# Patient Record
Sex: MALE | Race: OTHER | HISPANIC OR LATINO | Employment: UNEMPLOYED | URBAN - METROPOLITAN AREA
[De-identification: names, ages, dates, MRNs, and addresses within clinical notes are randomized per-mention and may not be internally consistent; named-entity substitution may affect disease eponyms.]

---

## 2023-08-08 ENCOUNTER — HOSPITAL ENCOUNTER (EMERGENCY)
Facility: HOSPITAL | Age: 63
Discharge: HOME | End: 2023-08-08
Attending: EMERGENCY MEDICINE | Admitting: EMERGENCY MEDICINE
Payer: COMMERCIAL

## 2023-08-08 ENCOUNTER — APPOINTMENT (EMERGENCY)
Dept: RADIOLOGY | Facility: HOSPITAL | Age: 63
End: 2023-08-08
Payer: COMMERCIAL

## 2023-08-08 VITALS
DIASTOLIC BLOOD PRESSURE: 60 MMHG | BODY MASS INDEX: 28.83 KG/M2 | HEART RATE: 50 BPM | TEMPERATURE: 97.1 F | OXYGEN SATURATION: 95 % | RESPIRATION RATE: 16 BRPM | SYSTOLIC BLOOD PRESSURE: 103 MMHG | WEIGHT: 184.08 LBS

## 2023-08-08 DIAGNOSIS — T50.901A ACCIDENTAL OVERDOSE, INITIAL ENCOUNTER: Primary | ICD-10-CM

## 2023-08-08 PROCEDURE — 71046 X-RAY EXAM CHEST 2 VIEWS: CPT

## 2023-08-08 RX ORDER — NALOXONE HYDROCHLORIDE 1 MG/ML
2 INJECTION PARENTERAL ONCE
Status: COMPLETED | OUTPATIENT
Start: 2023-08-08 | End: 2023-08-08

## 2023-08-08 RX ORDER — NALOXONE HYDROCHLORIDE 1 MG/ML
INJECTION INTRAMUSCULAR; INTRAVENOUS; SUBCUTANEOUS
Status: COMPLETED
Start: 2023-08-08 | End: 2023-08-08

## 2023-08-08 RX ADMIN — NALOXONE HYDROCHLORIDE 2 MG: 1 INJECTION PARENTERAL at 16:41

## 2023-08-08 RX ADMIN — SODIUM CHLORIDE 1000 ML: 0.9 INJECTION, SOLUTION INTRAVENOUS at 18:07

## 2023-08-08 RX ADMIN — NALOXONE HYDROCHLORIDE 2 MG: 1 INJECTION, SOLUTION INTRAMUSCULAR; INTRAVENOUS; SUBCUTANEOUS at 16:41

## 2023-08-08 NOTE — ED PROVIDER NOTES
History  Chief Complaint   Patient presents with   • Heroin Overdose - Accidental     EMS found him uncoumnscious. Pt admits to having done small bag of heroin. 1mg Narcan IV given       43-year-old male presents via EMS for reported overdose patient midst to "smoking crack". Patient reports he is unsure of the contents of the recreational substance he smoked today as he was unsure it was a "crack". Patient reports no other complaints. EMS reports they, patient unconscious unresponsive apneic and after the administration of 1 mg intravenous naloxone the patient had return to spontaneous respiratory effort. Patient arrives sleepy, able to answer questions however falling asleep when not interacted with notably decreasing oxygen saturation for which additional naloxone was administered. Patient has no external signs of trauma and denies any other complaints. Prior to Admission Medications   Prescriptions Last Dose Informant Patient Reported? Taking?   naloxone (NARCAN) 4 mg/0.1 mL nasal spray   No No   Sig: Administer 1 spray into a nostril. If no response after 2-3 minutes, give another dose in the other nostril using a new spray. sertraline (Zoloft) 50 mg tablet   No No   Sig: Take 1 tablet (50 mg total) by mouth daily      Facility-Administered Medications: None       Past Medical History:   Diagnosis Date   • Anxiety    • Depression    • Osteoporosis    • Pre-diabetes        Past Surgical History:   Procedure Laterality Date   • APPENDECTOMY         History reviewed. No pertinent family history. I have reviewed and agree with the history as documented. E-Cigarette/Vaping     E-Cigarette/Vaping Substances   • Nicotine No    • THC No    • CBD No    • Flavoring No    • Other No    • Unknown No      Social History     Tobacco Use   • Smoking status: Every Day     Packs/day: 1.00     Types: Cigarettes   • Smokeless tobacco: Never   Substance Use Topics   • Alcohol use: Never   • Drug use:  Yes Types: Cocaine, Fentanyl, Heroin       Review of Systems   Constitutional: Negative for chills, fatigue and fever. HENT: Negative for congestion, ear pain, rhinorrhea and sore throat. Eyes: Negative for redness. Respiratory: Negative for chest tightness and shortness of breath. Cardiovascular: Negative for chest pain and palpitations. Gastrointestinal: Negative for abdominal pain, nausea and vomiting. Genitourinary: Negative for dysuria and hematuria. Musculoskeletal: Negative. Skin: Negative for rash. Neurological: Negative for dizziness, syncope, light-headedness and numbness. Physical Exam  Physical Exam  Vitals and nursing note reviewed. Constitutional:       Appearance: Normal appearance. He is well-developed. HENT:      Head: Normocephalic and atraumatic. Comments: Exam is negative for hemotympanum no septal hematoma visualized. No  Signs of basilar skull fracture including periorbital ecchymosis and periauricular ecchymosis. No crepitus, deformities, depressions of the skull were palpated. Eyes:      General: No scleral icterus. Pupils: Pupils are equal, round, and reactive to light. Cardiovascular:      Rate and Rhythm: Normal rate and regular rhythm. Pulses: Normal pulses. Pulmonary:      Effort: Pulmonary effort is normal. No respiratory distress. Breath sounds: No stridor. Abdominal:      General: There is no distension. Palpations: There is no mass. Musculoskeletal:      Cervical back: Normal range of motion. Skin:     General: Skin is warm and dry. Capillary Refill: Capillary refill takes less than 2 seconds. Coloration: Skin is not jaundiced. Neurological:      Mental Status: He is alert and oriented to person, place, and time.       Gait: Gait normal.   Psychiatric:         Mood and Affect: Mood normal.         Vital Signs  ED Triage Vitals [08/08/23 1622]   Temperature Pulse Respirations Blood Pressure SpO2   (!) 97.1 °F (36.2 °C) 76 22 114/82 94 %      Temp Source Heart Rate Source Patient Position - Orthostatic VS BP Location FiO2 (%)   Tympanic Monitor Sitting Left arm --      Pain Score       --           Vitals:    08/08/23 1809 08/08/23 1839 08/08/23 1936 08/08/23 2039   BP: 91/55 93/62 103/60    Pulse: (!) 48  (!) 39 (!) 50   Patient Position - Orthostatic VS:             Visual Acuity      ED Medications  Medications   naloxone (NARCAN) 2 mg/2 mL injection 2 mg (2 mg Intravenous Given 8/8/23 1641)   sodium chloride 0.9 % bolus 1,000 mL (0 mL Intravenous Stopped 8/8/23 2038)       Diagnostic Studies  Results Reviewed     None                 XR chest 2 views    (Results Pending)              Procedures  Procedures         ED Course  ED Course as of 08/08/23 2050   Tue Aug 08, 2023   1834 Patient noted to have an episode of hypoxia as per nursing staff placed on 2 L nasal cannula maintaining oxygen saturation of 98%. Patient also noted to have an episode of bradycardia and hypotension high suspicion for polypharmacy such as xylazine. 1 L of normal saline fluids administered. 2028 Patient is awake alert and oriented, nursing staff informed this provider oxygen will be discontinued to trial patient on room air. Nursing staff providing p.o. challenge at this time and ambulating patient to determine gait. 2039 Patient is able to eat without problem, is ambulatory without difficulty. Patient clinically sober at this time, vital signs are stable and WNL. Patient was reexamined at this time and informed of laboratory and/or imaging results and was found to be stable for discharge. Return to emergency department criteria was reviewed with the patient who verbalized understanding and was agreeable to discharge and the treatment plan at this time. SBIRT 20yo+    Flowsheet Row Most Recent Value   Initial Alcohol Screen: US AUDIT-C     1.  How often do you have a drink containing alcohol? 0 Filed at: 08/08/2023 1627   2. How many drinks containing alcohol do you have on a typical day you are drinking? 0 Filed at: 08/08/2023 1627   3a. Male UNDER 65: How often do you have five or more drinks on one occasion? 0 Filed at: 08/08/2023 1627   3b. FEMALE Any Age, or MALE 65+: How often do you have 4 or more drinks on one occassion? 0 Filed at: 08/08/2023 1627   Audit-C Score 0 Filed at: 08/08/2023 1627   CHARLES: How many times in the past year have you. .. Used an illegal drug or used a prescription medication for non-medical reasons? Never Filed at: 08/08/2023 1627                    Medical Decision Making  53-year-old male presents for evaluation of reported overdose has been given naloxone which prompted a return of spontaneous respiratory effort arrives sleepy however conscious alert and oriented when interacting verbally, given episodes of hypoxia occurring when the patient is not interacted with naloxone was administered 2 mg intravenous which prompted improvement in respiratory effort, patient noted to have hypotension and episodes of hypoxia after initial naloxone prompting concern for potential polysubstance use for which the patient was observed, CXR for potential aspiration pneumonia obtained - on my interpretation is negative. 2LPM NC applied until patient able to tolerate room air and maintain O2 above 93%. IV normal saline was administered with improvement in blood pressure. Over the course of 4-1/2 hours the patient metabolized his recreational substances with return of normal vital signs, patient was ambulatory without gait disturbance able to eat and drink without difficulty. Patient denying complaints and clinically sober. No indication for blood work or imaging given lack of traumatic inciting event and patient's denial of any other complaints, vital signs do not meet SIRS criteria no occasion for septic work-up or empiric IV antibiotic therapy.     Strict return to ED precautions discussed. Patient and/or family members verbalizes understanding and agrees with plan. Patient is stable for discharge     Portions of the record may have been created with voice recognition software. Occasional wrong word or "sound a like" substitutions may have occurred due to the inherent limitations of voice recognition software. Read the chart carefully and recognize, using context, where substitutions have occurred. Risk  Prescription drug management. Disposition  Final diagnoses:   Accidental overdose, initial encounter     Time reflects when diagnosis was documented in both MDM as applicable and the Disposition within this note     Time User Action Codes Description Comment    8/8/2023  8:46 PM Diana Giraldo N [place] Accidental overdose, initial encounter       ED Disposition     ED Disposition   Discharge    Condition   Good    Date/Time   Tue Aug 8, 2023  8:46 PM    [facility] discharge to home/self care. Follow-up Information     Follow up With Specialties Details Why Contact [facility]  Schedule an appointment as soon as possible for a visit in 2 days for rehabilitation from addictive substance [id number],   Cliff Garcia, [address] [phone]          Patient's Medications   Discharge Prescriptions    No medications on file       No discharge procedures on file.     PDMP Review     None          ED Provider  Electronically Signed by           Daniel Chen PA-C  08/08/23 2050

## 2023-08-09 NOTE — ED NOTES
Pt awake and alert, eating sandwich watching TV. Pt used bathroom and is steady on his feet. Removed nasal cannula.       Hans Davis RN  08/08/23 2039

## 2023-08-14 ENCOUNTER — OFFICE VISIT (OUTPATIENT)
Dept: FAMILY MEDICINE CLINIC | Facility: CLINIC | Age: 63
End: 2023-08-14

## 2023-08-14 VITALS
BODY MASS INDEX: 30.76 KG/M2 | DIASTOLIC BLOOD PRESSURE: 80 MMHG | RESPIRATION RATE: 16 BRPM | TEMPERATURE: 97.4 F | HEIGHT: 67 IN | WEIGHT: 196 LBS | HEART RATE: 58 BPM | SYSTOLIC BLOOD PRESSURE: 170 MMHG | OXYGEN SATURATION: 97 %

## 2023-08-14 DIAGNOSIS — I10 PRIMARY HYPERTENSION: Primary | ICD-10-CM

## 2023-08-14 DIAGNOSIS — Z12.11 SCREEN FOR COLON CANCER: ICD-10-CM

## 2023-08-14 PROCEDURE — 99214 OFFICE O/P EST MOD 30 MIN: CPT | Performed by: FAMILY MEDICINE

## 2023-08-14 RX ORDER — BLOOD PRESSURE TEST KIT
KIT MISCELLANEOUS 2 TIMES DAILY
Qty: 1 KIT | Refills: 0 | Status: SHIPPED | OUTPATIENT
Start: 2023-08-14

## 2023-08-14 RX ORDER — LISINOPRIL 10 MG/1
10 TABLET ORAL DAILY
Qty: 90 TABLET | Refills: 3 | Status: SHIPPED | OUTPATIENT
Start: 2023-08-14

## 2023-08-14 NOTE — PROGRESS NOTES
Name: Siri Varghese      : 1960      MRN: [id number]  Encounter Provider: Kaylee Banda MD  Encounter Date: 2023   Encounter department: [address] [facility]     1. Primary hypertension  Assessment & Plan:  Assessment:   · Blood pressure at today’s office visit 170/80 mmHg, poorly controlled   · Blood Pressure goal as per JNC-8 less than 140/90 mmHg,   · Currently on not on medications    Plan:   · Will start patient on Lisinopril 10 mg qd  · Avoid beta blockers, patient with hx of bradycardia and  cocaine use, last 3 weeks ago. · BP goals and possible side effects reviewed with patient, recommended to call the office/schedule appointment  if need prior to his next visit if needed   · The patient was educated on the importance of medication compliance and to monitor her blood pressure at home on a  daily basis. · Ideally in quiet room; after 5 minutes of rest, sited, legs uncrossed and in a relaxed environment. · Recommended to  bring BP diary in visit. · Will recommend performing aerobic exercise for at least more than 3 times per week or more that 150 min x week of moderate physical activity. · Will recommend sodium and fat reduction and to increase fruit consumption. · Reassess BP in 2 weeks   · ED precautions given. Orders:  -     lisinopril (ZESTRIL) 10 mg tablet; Take 1 tablet (10 mg total) by mouth daily  -     Blood Pressure KIT; Use 2 (two) times a day  -     Albumin / creatinine urine ratio;  Future         Subjective      It was a pleasure to see Mino Ortiz is a 58 y.o.  male with PMH cocaine consumption, bradycardia and heroine use here today for HTN evaluation   Denies unintentional weight loss, fever, chills, fatigue, weakness, headaches, dizziness, rashes, blurred vision, nausea, palpitation, chest pain, SOB, abdominal pain, urinary changes, bowel changes, legs swelling/pain, sleep problems,  sick contacts or recent travel. Hypertension  This is a chronic problem. The current episode started today. The problem is unchanged. The problem is uncontrolled. Pertinent negatives include no chest pain, headaches, palpitations or shortness of breath. Review of Systems   Constitutional: Negative for activity change, appetite change, chills, fatigue and fever. HENT: Negative for congestion, postnasal drip, rhinorrhea and sore throat. Eyes: Negative for visual disturbance. Respiratory: Negative for cough, chest tightness, shortness of breath and wheezing. Cardiovascular: Negative for chest pain, palpitations and leg swelling. Gastrointestinal: Negative for abdominal distention, abdominal pain, blood in stool, constipation, diarrhea, nausea and vomiting. Genitourinary: Negative for difficulty urinating, dysuria and hematuria. Musculoskeletal: Negative for arthralgias and myalgias. Skin: Negative for rash. Neurological: Negative for dizziness, syncope, weakness, light-headedness, numbness and headaches. Psychiatric/Behavioral: Negative for agitation, behavioral problems, self-injury, sleep disturbance and suicidal ideas. Current Outpatient Medications on File Prior to Visit   Medication Sig   • naloxone (NARCAN) 4 mg/0.1 mL nasal spray Administer 1 spray into a nostril. If no response after 2-3 minutes, give another dose in the other nostril using a new spray. • sertraline (Zoloft) 50 mg tablet Take 1 tablet (50 mg total) by mouth daily       Objective     /80 (BP Location: Right arm, Patient Position: Sitting, Cuff Size: Standard)   Pulse 58   Temp (!) 97.4 °F (36.3 °C) (Temporal)   Resp 16   Ht 5' 7" (1.702 m)   Wt 88.9 kg (196 lb)   SpO2 97%   BMI 30.70 kg/m²     Physical Exam  Vitals and nursing note reviewed. Constitutional:       General: He is not in acute distress. Appearance: He is well-developed. He is obese. He is not ill-appearing, toxic-appearing or diaphoretic. HENT:      Head: Normocephalic and atraumatic. Eyes:      General: No scleral icterus. Extraocular Movements: Extraocular movements intact. Cardiovascular:      Rate and Rhythm: Normal rate and regular rhythm. No extrasystoles are present. Pulses:           Radial pulses are 2+ on the right side and 2+ on the left side. Heart sounds: Normal heart sounds, S1 normal and S2 normal. Heart sounds not distant. No murmur heard. No systolic murmur is present. No diastolic murmur is present. No friction rub. No gallop. No S3 or S4 sounds. Pulmonary:      Effort: Pulmonary effort is normal. No respiratory distress. Breath sounds: Normal breath sounds and air entry. Abdominal:      General: Bowel sounds are normal.      Palpations: Abdomen is soft. There is no mass. Tenderness: There is no abdominal tenderness. There is no right CVA tenderness, left CVA tenderness, guarding or rebound. Musculoskeletal:         General: No swelling, tenderness, deformity or signs of injury. Normal range of motion. Cervical back: Normal range of motion. Right lower leg: No edema. Left lower leg: No edema. Feet:      Right foot:      Skin integrity: No ulcer, skin breakdown, erythema, warmth, callus or dry skin. Left foot:      Skin integrity: No ulcer, skin breakdown, erythema, warmth, callus or dry skin. Skin:     General: Skin is warm. Findings: No rash. Neurological:      General: No focal deficit present. Mental Status: He is alert.        Stephanie Mccullough MD

## 2023-08-14 NOTE — ASSESSMENT & PLAN NOTE
Assessment:   · Blood pressure at today’s office visit 170/80 mmHg, poorly controlled   · Blood Pressure goal as per JNC-8 less than 140/90 mmHg,   · Currently on not on medications    Plan:   · Will start patient on Lisinopril 10 mg qd  · Avoid beta blockers, patient with hx of bradycardia and  cocaine use, last 3 weeks ago. · BP goals and possible side effects reviewed with patient, recommended to call the office/schedule appointment  if need prior to his next visit if needed   · The patient was educated on the importance of medication compliance and to monitor her blood pressure at home on a  daily basis. · Ideally in quiet room; after 5 minutes of rest, sited, legs uncrossed and in a relaxed environment. · Recommended to  bring BP diary in visit. · Will recommend performing aerobic exercise for at least more than 3 times per week or more that 150 min x week of moderate physical activity. · Will recommend sodium and fat reduction and to increase fruit consumption. · Reassess BP in 2 weeks   · ED precautions given.

## 2023-08-28 ENCOUNTER — HOSPITAL ENCOUNTER (EMERGENCY)
Facility: HOSPITAL | Age: 63
Discharge: HOME | End: 2023-08-28
Attending: EMERGENCY MEDICINE | Admitting: EMERGENCY MEDICINE
Payer: COMMERCIAL

## 2023-08-28 VITALS
DIASTOLIC BLOOD PRESSURE: 99 MMHG | HEART RATE: 82 BPM | OXYGEN SATURATION: 94 % | RESPIRATION RATE: 15 BRPM | SYSTOLIC BLOOD PRESSURE: 167 MMHG | TEMPERATURE: 97.6 F

## 2023-08-28 DIAGNOSIS — T50.901A ACCIDENTAL OVERDOSE, INITIAL ENCOUNTER: ICD-10-CM

## 2023-08-28 DIAGNOSIS — F11.10 HEROIN ABUSE (HCC): Primary | ICD-10-CM

## 2023-08-28 PROCEDURE — 99284 EMERGENCY DEPT VISIT MOD MDM: CPT

## 2023-08-28 PROCEDURE — 99285 EMERGENCY DEPT VISIT HI MDM: CPT | Performed by: EMERGENCY MEDICINE

## 2023-08-28 PROCEDURE — 93005 ELECTROCARDIOGRAM TRACING: CPT

## 2023-08-28 PROCEDURE — 96374 THER/PROPH/DIAG INJ IV PUSH: CPT

## 2023-08-28 RX ORDER — NALOXONE HYDROCHLORIDE 1 MG/ML
2 INJECTION INTRAMUSCULAR; INTRAVENOUS; SUBCUTANEOUS ONCE
Status: COMPLETED | OUTPATIENT
Start: 2023-08-28 | End: 2023-08-28

## 2023-08-28 RX ADMIN — NALOXONE HYDROCHLORIDE 2 MG: 1 INJECTION PARENTERAL at 20:54

## 2023-08-28 RX ADMIN — NALOXONE HYDROCHLORIDE 4 MG: 4 SPRAY NASAL at 22:10

## 2023-08-29 LAB
ATRIAL RATE: 74 BPM
P AXIS: 50 DEGREES
PR INTERVAL: 148 MS
QRS AXIS: 74 DEGREES
QRSD INTERVAL: 76 MS
QT INTERVAL: 366 MS
QTC INTERVAL: 406 MS
T WAVE AXIS: 6 DEGREES
VENTRICULAR RATE: 74 BPM

## 2023-08-29 PROCEDURE — 93010 ELECTROCARDIOGRAM REPORT: CPT | Performed by: INTERNAL MEDICINE

## 2023-08-29 NOTE — ED PROVIDER NOTES
History  Chief Complaint   Patient presents with   • Overdose - Accidental     Pt states he did "a little bit" of heroin tonight. States he snorts it. Pinpoint pupils in triage. 59 yo M brought by ambulance from home, where he was found by his daughter with his eyes rolled back, blue lips, slow breathing, and fast heart rate, while unresponsive in his bed. He was last normal at dinner and his daughter knows he went to take a shower, and it was shortly after getting out of the shower that she checked on him and found him like that. She said he was also sweaty. She called EMS when he wasn't responding. Paramedics were able to arouse him verbally, and he admitted to using heroin, which his daughter affirmed was her suspicion, because he has recently been relapsing. On arrival in the ED, he is still awake, except he becomes drowsy and slows his breathing, with hypoxia if not constantly stimulated. He denies this was intentional overdose, and says it wasn't even very much, but because he hasn't been using frequently, it must have been too much for him. History provided by:  Patient      Prior to Admission Medications   Prescriptions Last Dose Informant Patient Reported? Taking? Blood Pressure KIT   No No   Sig: Use 2 (two) times a day   lisinopril (ZESTRIL) 10 mg tablet   No No   Sig: Take 1 tablet (10 mg total) by mouth daily   naloxone (NARCAN) 4 mg/0.1 mL nasal spray   No No   Sig: Administer 1 spray into a nostril. If no response after 2-3 minutes, give another dose in the other nostril using a new spray. sertraline (Zoloft) 50 mg tablet   No No   Sig: Take 1 tablet (50 mg total) by mouth daily      Facility-Administered Medications: None       Past Medical History:   Diagnosis Date   • Anxiety    • Depression    • Osteoporosis    • Pre-diabetes        Past Surgical History:   Procedure Laterality Date   • APPENDECTOMY         History reviewed. No pertinent family history.   I have reviewed and agree with the history as documented. E-Cigarette/Vaping     E-Cigarette/Vaping Substances   • Nicotine No    • THC No    • CBD No    • Flavoring No    • Other No    • Unknown No      Social History     Tobacco Use   • Smoking status: Every Day     Packs/day: 1.00     Types: Cigarettes   • Smokeless tobacco: Never   Substance Use Topics   • Alcohol use: Never   • Drug use: Yes     Types: Cocaine, Fentanyl, Heroin       Review of Systems    Physical Exam  Physical Exam  Vitals and nursing note reviewed. Constitutional:       Appearance: He is well-developed and well-groomed. HENT:      Head: Normocephalic and atraumatic. Right Ear: External ear normal.      Left Ear: External ear normal.      Nose: Nose normal.   Eyes:      Intraocular pressure: Right eye pressure is 1 mmHg. Left eye pressure is 1 mmHg. Conjunctiva/sclera:      Right eye: Right conjunctiva is injected. Left eye: Left conjunctiva is injected. Pupils: Pupils are equal, round, and reactive to light. Cardiovascular:      Rate and Rhythm: Normal rate. Pulmonary:      Effort: Pulmonary effort is normal.   Abdominal:      Tenderness: There is no abdominal tenderness. Musculoskeletal:         General: Normal range of motion. Cervical back: Normal range of motion and neck supple. Skin:     General: Skin is warm and dry. Capillary Refill: Capillary refill takes less than 2 seconds. Neurological:      Mental Status: He is oriented to person, place, and time and easily aroused. Cranial Nerves: No cranial nerve deficit. Coordination: Coordination normal.   Psychiatric:         Speech: Speech is delayed. Behavior: Behavior is slowed.          Vital Signs  ED Triage Vitals [08/28/23 2048]   Temperature Pulse Respirations Blood Pressure SpO2   97.6 °F (36.4 °C) 82 15 (!) 154/103 97 %      Temp Source Heart Rate Source Patient Position - Orthostatic VS BP Location FiO2 (%)   Oral Monitor Lying Right arm --      Pain Score       --           Vitals:    08/28/23 2048 08/28/23 2130 08/28/23 2200   BP: (!) 154/103 148/95 167/99   Pulse: 82 84 82   Patient Position - Orthostatic VS: Lying Lying Lying         Visual Acuity  Visual Acuity    Flowsheet Row Most Recent Value   L Pupil Size (mm) 1   R Pupil Size (mm) 1          ED Medications  Medications   naloxone (NARCAN) injection 2 mg (2 mg Intravenous Given 8/28/23 2054)   naloxone nasal- Given to patient by provider at discharge. (NARCAN) 4 mg/0.1 mL nasal spray 4 mg (4 mg Does not apply Given by Other 8/28/23 2210)       Diagnostic Studies  Results Reviewed     None                 No orders to display              Procedures  Procedures         ED Course  ED Course as of 08/29/23 0037   Mon Aug 28, 2023   2132 After naloxone, he is fully awake, c/w opiate overdose. He says he is trying to completely stop using, and has been attached to resources. His daughter is aware of his struggles as well. The goal is to observe for an interval until he has metabolized enough to not require repeated doses of naloxone. 2211 He remains alert, he spoke with [facility] and does not wish to seek inpatient rehab at this time                                             MDM    Disposition  Final diagnoses:   Heroin abuse ([address])   Accidental overdose, initial encounter     Time reflects when diagnosis was documented in both MDM as applicable and the Disposition within this note     Time User Action Codes Description Comment    8/28/2023  9:07 PM Desire Saeed Add [F11.10] Heroin abuse ([address])     8/28/2023  9:07 PM [place], [facility] [place] Accidental overdose, initial encounter       ED Disposition     ED Disposition   Discharge    Condition   Good    Date/Time   Mon Aug 28, 2023 10:07 PM    [facility] discharge to home/self care.                Follow-up Information    None         Discharge Medication List as of 8/28/2023 10:08 PM      CONTINUE these medications which have NOT CHANGED    Details   Blood Pressure KIT Use 2 (two) times a day, Starting Mon 8/14/2023, Normal      lisinopril (ZESTRIL) 10 mg tablet Take 1 tablet (10 mg total) by mouth daily, Starting Mon 8/14/2023, Normal      naloxone (NARCAN) 4 mg/0.1 mL nasal spray Administer 1 spray into a nostril. If no response after 2-3 minutes, give another dose in the other nostril using a new spray., Normal      sertraline (Zoloft) 50 mg tablet Take 1 tablet (50 mg total) by mouth daily, Starting Tue 11/15/2022, Normal             No discharge procedures on file.     PDMP Review     None          ED Provider  Electronically Signed by           Viviana Olmedo MD  08/29/23 1378

## 2023-08-29 NOTE — ED NOTES
[facility] attempted to contact patient, [facility] employee is to call back with  to speak to patient.       Ej Jason RN  08/28/23 0515

## 2023-08-29 NOTE — DISCHARGE INSTRUCTIONS
Follow up with your regular doctor and continue your medications as prescribed. Please return if you are feeling overwhelmed or if you feel unsafe toward yourself or others.

## 2023-08-29 NOTE — ED CARE HANDOFF
[facility] Warm Handoff Outcome Note    Patient name Nelson Credit  Location ED-28/ED-28 MRN [id number]  Age: 58 y.o. Plan Type: Warm Handoff                                                                                    Plan Date: 8/28/2023  Service:  ED Warm Handoff      Substance Use History:  Heroin    Warm Handoff Update:  [facility] called and got an , patient was discharged and they will follow up.     Warm Handoff Outcome: Treatment Related Resources

## 2023-10-23 ENCOUNTER — HOSPITAL ENCOUNTER (EMERGENCY)
Facility: HOSPITAL | Age: 63
Discharge: HOME | End: 2023-10-23
Attending: EMERGENCY MEDICINE | Admitting: EMERGENCY MEDICINE
Payer: COMMERCIAL

## 2023-10-23 VITALS
SYSTOLIC BLOOD PRESSURE: 105 MMHG | HEART RATE: 48 BPM | DIASTOLIC BLOOD PRESSURE: 63 MMHG | WEIGHT: 177.91 LBS | OXYGEN SATURATION: 95 % | TEMPERATURE: 96.8 F | BODY MASS INDEX: 27.86 KG/M2 | RESPIRATION RATE: 16 BRPM

## 2023-10-23 DIAGNOSIS — T40.601A OPIATE OVERDOSE, ACCIDENTAL OR UNINTENTIONAL, INITIAL ENCOUNTER (HCC): Primary | ICD-10-CM

## 2023-10-23 PROCEDURE — 99284 EMERGENCY DEPT VISIT MOD MDM: CPT | Performed by: EMERGENCY MEDICINE

## 2023-10-23 PROCEDURE — 99284 EMERGENCY DEPT VISIT MOD MDM: CPT

## 2023-10-23 RX ORDER — NALOXONE HYDROCHLORIDE 1 MG/ML
INJECTION INTRAMUSCULAR; INTRAVENOUS; SUBCUTANEOUS
Status: COMPLETED
Start: 2023-10-23 | End: 2023-10-23

## 2023-10-23 RX ORDER — NALOXONE HYDROCHLORIDE 1 MG/ML
1 INJECTION PARENTERAL ONCE
Status: COMPLETED | OUTPATIENT
Start: 2023-10-23 | End: 2023-10-23

## 2023-10-23 NOTE — CERTIFIED RECOVERY SPECIALIST
Certified  Note    Patient name: Riya Heard  Location: ED 06/ED [facility]: [address]  Attending:  Manas Luz MD MRN [id number]  : 1960  Age: 58 y.o. Sex: male Date 10/23/2023         Substance Use History:     Social History     Substance and Sexual Activity   Alcohol Use Never        Social History     Substance and Sexual Activity   Drug Use Yes    Types: Cocaine, Fentanyl, Heroin     Response from HOST    He requested OP and we have him scheduled for an appt on Friday, 10/27 at 11am with Aneesh Wade at [facility]. The address is : [address] [place] [address]  Phone number: [phone] ? ? Thank you!! Saray [facility]   [address] [place] [address],[facility], [address]   Phone: [phone]   Fax: [phone]  [email or web address]

## 2023-10-23 NOTE — CERTIFIED RECOVERY SPECIALIST
Certified  Note    Patient name: Scarlet Kern  Location: ED 06/ED [facility]: [address]  Attending:  Elissa Gilbert MD MRN [id number]  : 1960  Age: 58 y.o. Sex: male Date 10/23/2023         Substance Use History:     Social History     Substance and Sexual Activity   Alcohol Use Never        Social History     Substance and Sexual Activity   Drug Use Yes    Types: Cocaine, Fentanyl, Heroin     CRS attempted contact. Patient did respond to knock, but did not wake.   Will continue to follow    Nova Miranda

## 2023-10-23 NOTE — CERTIFIED RECOVERY SPECIALIST
Certified  Note    Patient name: Abhi Estrada  Location: ED 06/ED [facility]: [address]  Attending:  Mylene Bansal MD MRN [id number]  : 1960  Age: 58 y.o.     Sex: male Date 10/23/2023         Substance Use History:     Social History     Substance and Sexual Activity   Alcohol Use Never        Social History     Substance and Sexual Activity   Drug Use Yes    Types: Cocaine, Fentanyl, Heroin     CRS outreach made to HOST for update    [address] [facility]

## 2023-10-23 NOTE — ED PROVIDER NOTES
History  Chief Complaint   Patient presents with    Overdose - Accidental     Pt found unresponsive agonal breathing, EMS gave Narcan IM. Pt arrives awake with normal respirations. Pt admits to snorting 1 bag of fentanyl today     79-year-old male, presents by EMS after overdose. Patient states he snorted 1 bag of fentanyl. EMS reports patient was found unresponsive, given 2 mg IM Ativan by EMS, patient woke up afterwards. Patient has no current complaints, states he was not trying to harm himself. Denies any other drug or alcohol use. History provided by:  Patient and EMS personnel   used: No    Overdose - Accidental      Prior to Admission Medications   Prescriptions Last Dose Informant Patient Reported? Taking? Blood Pressure KIT Past Month  No Yes   Sig: Use 2 (two) times a day   lisinopril (ZESTRIL) 10 mg tablet 10/22/2023  No Yes   Sig: Take 1 tablet (10 mg total) by mouth daily   naloxone (NARCAN) 4 mg/0.1 mL nasal spray Past Month  No Yes   Sig: Administer 1 spray into a nostril. If no response after 2-3 minutes, give another dose in the other nostril using a new spray. sertraline (Zoloft) 50 mg tablet 10/22/2023  No Yes   Sig: Take 1 tablet (50 mg total) by mouth daily      Facility-Administered Medications: None       Past Medical History:   Diagnosis Date    Anxiety     Depression     Osteoporosis     Pre-diabetes        Past Surgical History:   Procedure Laterality Date    APPENDECTOMY         History reviewed. No pertinent family history. I have reviewed and agree with the history as documented.     E-Cigarette/Vaping     E-Cigarette/Vaping Substances    Nicotine No     THC No     CBD No     Flavoring No     Other No     Unknown No      Social History     Tobacco Use    Smoking status: Every Day     Packs/day: 1.00     Types: Cigarettes    Smokeless tobacco: Never   Substance Use Topics    Alcohol use: Never    Drug use: Yes     Types: Cocaine, Fentanyl, Heroin Review of Systems   Constitutional: Negative. Gastrointestinal: Negative. Neurological: Negative. Physical Exam  Physical Exam  Vitals and nursing note reviewed. Constitutional:       General: He is not in acute distress. HENT:      Head: Normocephalic and atraumatic. Eyes:      Extraocular Movements: Extraocular movements intact. Pupils: Pupils are equal, round, and reactive to light. Cardiovascular:      Rate and Rhythm: Regular rhythm. Bradycardia present. Pulmonary:      Effort: Pulmonary effort is normal.      Breath sounds: Normal breath sounds. Musculoskeletal:         General: Normal range of motion. Skin:     General: Skin is warm and dry. Neurological:      General: No focal deficit present. Mental Status: He is alert and oriented to person, place, and time. Motor: No weakness. Vital Signs  ED Triage Vitals [10/23/23 1217]   Temperature Pulse Respirations Blood Pressure SpO2   (!) 96.8 °F (36 °C) (!) 53 18 92/53 96 %      Temp Source Heart Rate Source Patient Position - Orthostatic VS BP Location FiO2 (%)   Tympanic Monitor Lying Left arm --      Pain Score       --           Vitals:    10/23/23 1217 10/23/23 1402   BP: 92/53 105/63   Pulse: (!) 53 (!) 48   Patient Position - Orthostatic VS: Lying          Visual Acuity      ED Medications  Medications   naloxone nasal- Given to patient by provider at discharge. (NARCAN) 4 mg/0.1 mL nasal spray 4 mg (has no administration in time range)   naloxone (FOR EMS ONLY) [facility]) 2 MG/2ML injection 2 mg ( Does not apply Given to EMS 10/23/23 1222)       Diagnostic Studies  Results Reviewed       None                   No orders to display              Procedures  Procedures         ED Course  ED Course as of 10/23/23 1422   Mon Oct 23, 2023   1421 Patient has been comfortable with normal respiratory effort since arrival.  Able to ambulate without difficulty. Set up with outpatient rehab for this week.   Given Narcan to take on discharge. Medical Decision Making  79-year-old male, presents after overdose by EMS. Differential diagnosis includes opiate overdose, intoxication, injury among other diagnoses. On exam, patient is awake and alert with no complaints. Normal respiratory effort, no signs of injury on exam.  We will continue to monitor in ED and reevaluate. Amount and/or Complexity of Data Reviewed  Independent Historian: EMS    Risk  Prescription drug management. Disposition  Final diagnoses:   Opiate overdose, accidental or unintentional, initial encounter [facility])     Time reflects when diagnosis was documented in both MDM as applicable and the Disposition within this note       Time User Action Codes Description Comment    10/23/2023  2:17 PM You Butler Accidental overdose, initial encounter     10/23/2023  2:18 PM Felipe Butler [facility] Opiate overdose, accidental or unintentional, initial encounter ([address])     10/23/2023  2:18 PM Adrian Espinoza Modify [T40.601A] Opiate overdose, accidental or unintentional, initial encounter ([address])     10/23/2023  2:18 PM Ga Butler Accidental overdose, initial encounter           ED Disposition       ED Disposition   Discharge    Condition   Stable    Date/Time   Mon Oct 23, 2023  2:18 PM    [address] [facility] discharge to home/self care. Follow-up Information    None         Patient's Medications   Discharge Prescriptions    No medications on file       No discharge procedures on file.     PDMP Review       None            ED Provider  Electronically Signed by             Eloisa Baker MD  10/23/23 5622

## 2023-10-23 NOTE — ED CARE HANDOFF
[address] [facility] Warm Handoff Outcome Note    Patient name Nicola Monique  Location ED 06/ED 06 MRN [id number]  Age: 58 y.o. Plan Type: Warm Handoff                                                                                    Plan Date: 10/23/2023  Service:  ED Warm Handoff      Substance Use History:  Polysub    Warm Handoff Update:  Pt going to OP services: Friday, 10/27 at 11:00am with [facility]. In person appointment at [facility]. [address] [place] [address], [address] [place] [address]  Phone number: [phone]. Warm Handoff Outcome:  Outpatient

## 2024-09-16 ENCOUNTER — RA CDI HCC (OUTPATIENT)
Dept: OTHER | Facility: HOSPITAL | Age: 64
End: 2024-09-16

## 2024-09-23 ENCOUNTER — OFFICE VISIT (OUTPATIENT)
Dept: FAMILY MEDICINE CLINIC | Facility: CLINIC | Age: 64
End: 2024-09-23

## 2024-09-23 VITALS
HEART RATE: 76 BPM | TEMPERATURE: 98 F | WEIGHT: 184 LBS | DIASTOLIC BLOOD PRESSURE: 86 MMHG | BODY MASS INDEX: 28.88 KG/M2 | RESPIRATION RATE: 18 BRPM | OXYGEN SATURATION: 98 % | SYSTOLIC BLOOD PRESSURE: 158 MMHG | HEIGHT: 67 IN

## 2024-09-23 DIAGNOSIS — R73.03 PREDIABETES: ICD-10-CM

## 2024-09-23 DIAGNOSIS — Z12.11 COLON CANCER SCREENING: ICD-10-CM

## 2024-09-23 DIAGNOSIS — I10 PRIMARY HYPERTENSION: ICD-10-CM

## 2024-09-23 DIAGNOSIS — Z11.4 SCREENING FOR HIV (HUMAN IMMUNODEFICIENCY VIRUS): ICD-10-CM

## 2024-09-23 DIAGNOSIS — Z23 ENCOUNTER FOR IMMUNIZATION: ICD-10-CM

## 2024-09-23 DIAGNOSIS — Z11.59 NEED FOR HEPATITIS C SCREENING TEST: ICD-10-CM

## 2024-09-23 DIAGNOSIS — F32.1 MODERATE MAJOR DEPRESSION (HCC): ICD-10-CM

## 2024-09-23 PROBLEM — F41.9 ANXIETY AND DEPRESSION: Status: ACTIVE | Noted: 2024-09-23

## 2024-09-23 PROBLEM — F32.A ANXIETY AND DEPRESSION: Status: ACTIVE | Noted: 2024-09-23

## 2024-09-23 PROCEDURE — G2211 COMPLEX E/M VISIT ADD ON: HCPCS | Performed by: DENTIST

## 2024-09-23 PROCEDURE — G0008 ADMIN INFLUENZA VIRUS VAC: HCPCS | Performed by: DENTIST

## 2024-09-23 PROCEDURE — 90662 IIV NO PRSV INCREASED AG IM: CPT | Performed by: DENTIST

## 2024-09-23 PROCEDURE — 99213 OFFICE O/P EST LOW 20 MIN: CPT | Performed by: DENTIST

## 2024-09-23 RX ORDER — LISINOPRIL 10 MG/1
10 TABLET ORAL DAILY
Qty: 90 TABLET | Refills: 3 | Status: SHIPPED | OUTPATIENT
Start: 2024-09-23

## 2024-09-23 NOTE — ASSESSMENT & PLAN NOTE
/86  Patient reports he has been out of home medications for the past almost 2 months  Home meds: lisinopril 10 mg qd    -Record BP at home and bring to next visit   -Refilled lisinopril 10 mg qd   -Advised to maintain a low sodium diet   -Alb/Cr ratio, bmp, A1c, lipid panel   -F/U w/results     Orders:    lisinopril (ZESTRIL) 10 mg tablet; Take 1 tablet (10 mg total) by mouth daily    Basic metabolic panel; Future    Albumin / creatinine urine ratio; Future    Lipid Panel with Direct LDL reflex; Future

## 2024-09-23 NOTE — ASSESSMENT & PLAN NOTE
Depression Screening Follow-up Plan: Patient's depression screening was positive with a PHQ-9 score of 7. Patient assessed for underlying major depression. They have no active suicidal ideations. Brief counseling provided and recommend additional follow-up/re-evaluation next office visit.  Sean score of 6 and patient reports his anxiety has improved since he began living with his daughter   Home meds: Zoloft 50 mg qd    -Continue home meds

## 2024-09-23 NOTE — PROGRESS NOTES
Ambulatory Visit  Name: Mino Cyr      : 1960      MRN: [id number]  Encounter Provider: Elmira Gray MD  Encounter Date: 2024   Encounter department: [facility]    Assessment & Plan  Primary hypertension  /86  Patient reports he has been out of home medications for the past almost 2 months  Home meds: lisinopril 10 mg qd    -Record BP at home and bring to next visit   -Refilled lisinopril 10 mg qd   -Advised to maintain a low sodium diet   -Alb/Cr ratio, bmp, A1c, lipid panel   -F/U w/results     Orders:    lisinopril (ZESTRIL) 10 mg tablet; Take 1 tablet (10 mg total) by mouth daily    Basic metabolic panel; Future    Albumin / creatinine urine ratio; Future    Lipid Panel with Direct LDL reflex; Future    Moderate major depression (HCC)  Depression Screening Follow-up Plan: Patient's depression screening was positive with a PHQ-9 score of 7. Patient assessed for underlying major depression. They have no active suicidal ideations. Brief counseling provided and recommend additional follow-up/re-evaluation next office visit.  Reports he has been out of zoloft for the past 2 months    -refilled zoloft 50 mg qd     Orders:    sertraline (Zoloft) 50 mg tablet; Take 1 tablet (50 mg total) by mouth daily    Need for hepatitis C screening test    Orders:    Hepatitis C Antibody; Future    Screening for HIV (human immunodeficiency virus)    Orders:    HIV 1/2 AG/AB w Reflex [facility] for 2 yr old and above; Future    Encounter for immunization    Orders:    influenza vaccine, high-dose, PF 0.5 mL (Fluzone High Dose)    Colon cancer screening    Orders:    Ambulatory Referral to Gastroenterology; Future    Prediabetes    Orders:    Hemoglobin A1C; Future    Lipid Panel with Direct LDL reflex; Future       History of Present Illness     Mr. Blum is a pleasant 64 you male patient who presents to clinic today to re-establish care as he was lost to f/u due  to being incarcerated. Patient reports he has been out of his home medications for the past 2 months and is interested in getting lab work done and colonoscopy for GI cancer screening as it is long overdue.   Patient reports that he had labs doen while in detention and Doctor told him he had prediabetes however does not recall the value.   Patient denies blood in stool, urinary symtoms, chest pain, palpiattions, headaches, n/v/d/c.  Pateint advised to get shingles vaccine at his local pharmacy as his insurance does not cover shingles vaccine in office     Hypertension  Pertinent negatives include no chest pain, palpitations or shortness of breath.       History obtained from : patient  Review of Systems   Constitutional:  Negative for chills and fever.   HENT:  Negative for ear pain and sore throat.    Eyes:  Negative for pain and visual disturbance.   Respiratory:  Negative for cough and shortness of breath.    Cardiovascular:  Negative for chest pain and palpitations.   Gastrointestinal:  Negative for abdominal pain and vomiting.   Genitourinary:  Negative for dysuria and hematuria.   Musculoskeletal:  Negative for arthralgias and back pain.   Skin:  Negative for color change and rash.   Neurological:  Negative for seizures and syncope.   All other systems reviewed and are negative.    Current Outpatient Medications on File Prior to Visit   Medication Sig Dispense Refill    Blood Pressure KIT Use 2 (two) times a day 1 kit 0    naloxone (NARCAN) 4 mg/0.1 mL nasal spray Administer 1 spray into a nostril. If no response after 2-3 minutes, give another dose in the other nostril using a new spray. 1 each 0    [DISCONTINUED] lisinopril (ZESTRIL) 10 mg tablet Take 1 tablet (10 mg total) by mouth daily 90 tablet 3    [DISCONTINUED] sertraline (Zoloft) 50 mg tablet Take 1 tablet (50 mg total) by mouth daily 30 tablet 1     No current facility-administered medications on file prior to visit.          Objective     /86  "(BP Location: Right arm, Patient Position: Sitting, Cuff Size: Standard)   Pulse 76   Temp 98 °F (36.7 °C) (Temporal)   Resp 18   Ht 5' 7\" (1.702 m)   Wt 83.5 kg (184 lb)   SpO2 98%   BMI 28.82 kg/m²     Physical Exam  Vitals and nursing note reviewed.   Constitutional:       General: He is not in acute distress.     Appearance: He is well-developed.   HENT:      Head: Normocephalic and atraumatic.   Eyes:      Conjunctiva/sclera: Conjunctivae normal.   Cardiovascular:      Rate and Rhythm: Normal rate and regular rhythm.      Heart sounds: No murmur heard.  Pulmonary:      Effort: Pulmonary effort is normal. No respiratory distress.      Breath sounds: Normal breath sounds.   Abdominal:      Palpations: Abdomen is soft.      Tenderness: There is no abdominal tenderness.   Musculoskeletal:         General: No swelling.      Cervical back: Neck supple.   Skin:     General: Skin is warm and dry.      Capillary Refill: Capillary refill takes less than 2 seconds.   Neurological:      Mental Status: He is alert.   Psychiatric:         Mood and Affect: Mood normal.         "

## 2024-09-23 NOTE — ASSESSMENT & PLAN NOTE
Depression Screening Follow-up Plan: Patient's depression screening was positive with a PHQ-9 score of 7. Patient assessed for underlying major depression. They have no active suicidal ideations. Brief counseling provided and recommend additional follow-up/re-evaluation next office visit.  Reports he has been out of zoloft for the past 2 months    -refilled zoloft 50 mg qd     Orders:    sertraline (Zoloft) 50 mg tablet; Take 1 tablet (50 mg total) by mouth daily

## 2024-09-24 ENCOUNTER — TELEPHONE (OUTPATIENT)
Age: 64
End: 2024-09-24

## 2024-09-24 ENCOUNTER — PREP FOR PROCEDURE (OUTPATIENT)
Age: 64
End: 2024-09-24

## 2024-09-24 DIAGNOSIS — Z12.11 SCREENING FOR COLON CANCER: Primary | ICD-10-CM

## 2024-09-24 NOTE — TELEPHONE ENCOUNTER
Scheduled date of colonoscopy (as of today): 10/18   Physician performing colonoscopy: OH   Location of colonoscopy: [facility]   Bowel prep reviewed with patient: JANET/GUILLERMO   Instructions reviewed with patient by: mail to pt home   Clearances: NONE

## 2024-09-24 NOTE — TELEPHONE ENCOUNTER
Referring Provider jose xiao    Pre- Screening:     Body mass index is 28.82 kg/m².  Has patient been referred for a routine screening Colonoscopy? yes  Is the patient between 45-75 years old? yes      Previous Colonoscopy no   If yes:        Does the patient want to see a Gastroenterologist prior to their procedure OR are they having any GI symptoms? no    Has the patient been hospitalized or had abdominal surgery in the past 6 months? no    Does the patient use supplemental oxygen? no    Does the patient take Coumadin, Lovenox, Plavix, Elliquis, Xarelto, or other blood thinning medication? no    Has the patient had a stroke, cardiac event, or stent placed in the past year? no        If patient is between 45yrs - 49yrs, please advise patient that we will have to confirm benefits & coverage with their insurance company for a routine screening colonoscopy.

## 2024-09-26 NOTE — TELEPHONE ENCOUNTER
Patient contacted office confused about his colonoscopy. Verified he has procedure scheduled 10/18 and all procedure directions will be mailed to his address. Patient expressed understanding.

## 2024-10-10 ENCOUNTER — TELEPHONE (OUTPATIENT)
Dept: GASTROENTEROLOGY | Facility: MEDICAL CENTER | Age: 64
End: 2024-10-10

## 2024-10-10 NOTE — TELEPHONE ENCOUNTER
Confirming Upcoming Procedure: Colonoscopy on October 18  Physician performing: Dr. Scott  Location of procedure:    Prep: Miralax  Diabetic: No

## 2024-10-18 ENCOUNTER — HOSPITAL ENCOUNTER (OUTPATIENT)
Dept: GASTROENTEROLOGY | Facility: HOSPITAL | Age: 64
Setting detail: OUTPATIENT SURGERY
Discharge: HOME | End: 2024-10-18
Attending: STUDENT IN AN ORGANIZED HEALTH CARE EDUCATION/TRAINING PROGRAM

## 2024-10-18 DIAGNOSIS — Z12.11 SCREENING FOR COLON CANCER: ICD-10-CM

## 2024-10-18 NOTE — QUICK NOTE
Brief GI Note:    Patient thought he was having an EGD rather than a colonoscopy. He did not prep, and he ate this morning. Will need to be rescheduled.

## 2024-10-23 ENCOUNTER — TELEPHONE (OUTPATIENT)
Dept: GASTROENTEROLOGY | Facility: MEDICAL CENTER | Age: 64
End: 2024-10-23

## 2024-10-23 NOTE — TELEPHONE ENCOUNTER
I spoke with pt and explain the colonoscopy instructions, pt verbalized understanding.  Miralax prep mailed

## 2024-10-23 NOTE — TELEPHONE ENCOUNTER
Scheduled date of colonoscopy (as of today):10/29/24  Physician performing colonoscopy:DR JAIYEOLA  Location of colonoscopy:[facility]  Bowel prep reviewed with patient:JANET/GUILLERMO  Instructions reviewed with patient by:DUNIA  Clearances: NA

## 2024-10-23 NOTE — TELEPHONE ENCOUNTER
Pt r/2 his colonoscopy to 10/29/24. I did go over instructions but I do not think the pt understands fully due to the language gap. I did ask the pt for an email to send the prep. The pt said he does not know it as he was not home and will call back. Please reach back out to the pt to explain the prep preferably using Omani or interpretor

## 2024-10-23 NOTE — TELEPHONE ENCOUNTER
Pt. Called back, warm transfer to GI scheduling Destiny to further assist with re-scheduling colonoscopy

## 2024-10-23 NOTE — TELEPHONE ENCOUNTER
Left voicemail and requested call back     Called patient to schedule procedure asked to please give us a call to schedule.       Reschedule colon  Received: 5 days ago  Abdulaziz Scott MD  [facility] [place] [facility]  Patient did not prep and ate this morning for open access colonoscopy. He thought he was having an EGD.    Could we reschedule and ensure he understands all the instructions? Swedish-speaking.    Thanks,  Anel

## 2024-10-28 RX ORDER — SODIUM CHLORIDE, SODIUM LACTATE, POTASSIUM CHLORIDE, CALCIUM CHLORIDE 600; 310; 30; 20 MG/100ML; MG/100ML; MG/100ML; MG/100ML
50 INJECTION, SOLUTION INTRAVENOUS CONTINUOUS
Status: CANCELLED | OUTPATIENT
Start: 2024-10-28

## 2024-10-29 ENCOUNTER — HOSPITAL ENCOUNTER (OUTPATIENT)
Dept: GASTROENTEROLOGY | Facility: HOSPITAL | Age: 64
Setting detail: OUTPATIENT SURGERY
Discharge: HOME | End: 2024-10-29
Attending: STUDENT IN AN ORGANIZED HEALTH CARE EDUCATION/TRAINING PROGRAM
Payer: MEDICARE

## 2024-10-29 ENCOUNTER — ANESTHESIA EVENT (OUTPATIENT)
Dept: GASTROENTEROLOGY | Facility: HOSPITAL | Age: 64
End: 2024-10-29
Payer: MEDICARE

## 2024-10-29 ENCOUNTER — ANESTHESIA (OUTPATIENT)
Dept: GASTROENTEROLOGY | Facility: HOSPITAL | Age: 64
End: 2024-10-29
Payer: MEDICARE

## 2024-10-29 VITALS
DIASTOLIC BLOOD PRESSURE: 82 MMHG | HEART RATE: 77 BPM | TEMPERATURE: 98.2 F | OXYGEN SATURATION: 100 % | SYSTOLIC BLOOD PRESSURE: 130 MMHG | RESPIRATION RATE: 15 BRPM

## 2024-10-29 DIAGNOSIS — Z12.11 SCREENING FOR COLON CANCER: ICD-10-CM

## 2024-10-29 PROCEDURE — 45385 COLONOSCOPY W/LESION REMOVAL: CPT | Performed by: INTERNAL MEDICINE

## 2024-10-29 PROCEDURE — 88305 TISSUE EXAM BY PATHOLOGIST: CPT | Performed by: PATHOLOGY

## 2024-10-29 RX ORDER — LIDOCAINE HYDROCHLORIDE 20 MG/ML
INJECTION, SOLUTION EPIDURAL; INFILTRATION; INTRACAUDAL; PERINEURAL AS NEEDED
Status: DISCONTINUED | OUTPATIENT
Start: 2024-10-29 | End: 2024-10-29

## 2024-10-29 RX ORDER — SODIUM CHLORIDE, SODIUM LACTATE, POTASSIUM CHLORIDE, CALCIUM CHLORIDE 600; 310; 30; 20 MG/100ML; MG/100ML; MG/100ML; MG/100ML
50 INJECTION, SOLUTION INTRAVENOUS CONTINUOUS
Status: DISCONTINUED | OUTPATIENT
Start: 2024-10-29 | End: 2024-11-02 | Stop reason: HOSPADM

## 2024-10-29 RX ORDER — EPHEDRINE SULFATE 50 MG/ML
INJECTION INTRAVENOUS AS NEEDED
Status: DISCONTINUED | OUTPATIENT
Start: 2024-10-29 | End: 2024-10-29

## 2024-10-29 RX ORDER — PROPOFOL 10 MG/ML
INJECTION, EMULSION INTRAVENOUS AS NEEDED
Status: DISCONTINUED | OUTPATIENT
Start: 2024-10-29 | End: 2024-10-29

## 2024-10-29 RX ORDER — PROPOFOL 10 MG/ML
INJECTION, EMULSION INTRAVENOUS CONTINUOUS PRN
Status: DISCONTINUED | OUTPATIENT
Start: 2024-10-29 | End: 2024-10-29

## 2024-10-29 RX ADMIN — PROPOFOL 100 MCG/KG/MIN: 10 INJECTION, EMULSION INTRAVENOUS at 08:45

## 2024-10-29 RX ADMIN — Medication 40 MG: at 08:47

## 2024-10-29 RX ADMIN — LIDOCAINE HYDROCHLORIDE 100 MG: 20 INJECTION, SOLUTION EPIDURAL; INFILTRATION; INTRACAUDAL at 08:44

## 2024-10-29 RX ADMIN — PROPOFOL 100 MG: 10 INJECTION, EMULSION INTRAVENOUS at 08:44

## 2024-10-29 RX ADMIN — SODIUM CHLORIDE, SODIUM LACTATE, POTASSIUM CHLORIDE, AND CALCIUM CHLORIDE 50 ML/HR: .6; .31; .03; .02 INJECTION, SOLUTION INTRAVENOUS at 08:31

## 2024-10-29 RX ADMIN — EPHEDRINE SULFATE 10 MG: 50 INJECTION INTRAVENOUS at 08:59

## 2024-10-29 NOTE — ANESTHESIA PREPROCEDURE EVALUATION
"Procedure:  COLONOSCOPY    Relevant Problems   CARDIO   (+) Primary hypertension      NEURO/PSYCH   (+) Anxiety and depression   (+) Moderate major depression (HCC)      Behavioral Health   (+) History of illicit drug use      Other   (+) History of hyperlipidemia        Physical Exam    Airway    Mallampati score: II  TM Distance: >3 FB  Neck ROM: full     Dental    upper dentures,     Cardiovascular  Cardiovascular exam normal    Pulmonary  Pulmonary exam normal     Other Findings        Anesthesia Plan  ASA Score- 2     Anesthesia Type- IV sedation with anesthesia with ASA Monitors.         Additional Monitors:     Airway Plan:            Plan Factors-Exercise tolerance (METS): >4 METS.    Chart reviewed.   Existing labs reviewed. Patient summary reviewed.    Patient is a current smoker.  Patient instructed to abstain from smoking on day of procedure. Patient smoked on day of surgery.            Induction-     Postoperative Plan-     Perioperative Resuscitation Plan - Level 1 - Full Code.       Informed Consent- Anesthetic plan and risks discussed with patient.  I personally reviewed this patient with the CRNA. Discussed and agreed on the Anesthesia Plan with the CRNA..        No results found for: \"HGBA1C\"    Lab Results   Component Value Date    K 4.2 10/11/2022     10/11/2022    CO2 30 10/11/2022    BUN 21 10/11/2022    CREATININE 1.26 10/11/2022    CALCIUM 9.1 10/11/2022    EGFR 60 10/11/2022       Lab Results   Component Value Date    WBC 6.36 10/11/2022    HGB 11.8 (L) 10/11/2022    HCT 36.2 (L) 10/11/2022    MCV 94 10/11/2022     10/11/2022     Normal sinus rhythm  Normal ECG  No previous ECGs available  Confirmed by Jim Ortez ([id number]) on 8/29/2023 5:20:03 AM      Specimen Collected: 08/28/23 20:47        "

## 2024-10-29 NOTE — ANESTHESIA POSTPROCEDURE EVALUATION
Post-Op Assessment Note    CV Status:  Stable  Pain Score: 0    Pain management: adequate       Mental Status:  Alert and awake   Hydration Status:  Euvolemic   PONV Controlled:  Controlled   Airway Patency:  Patent     Post Op Vitals Reviewed: Yes    No anethesia notable event occurred.    Staff: Anesthesiologist           Last Filed PACU Vitals:  Vitals Value Taken Time   Temp 98.2 °F (36.8 °C) 10/29/24 0919   Pulse 77 10/29/24 0919   /82 10/29/24 0919   Resp 15 10/29/24 0919   SpO2 100 % 10/29/24 0919       Modified Rick:  Activity: 2 (10/29/2024  9:19 AM)  Respiration: 2 (10/29/2024  9:19 AM)  Circulation: 2 (10/29/2024  9:19 AM)  Consciousness: 2 (10/29/2024  9:19 AM)  Oxygen Saturation: 2 (10/29/2024  9:19 AM)  Modified Rick Score: 10 (10/29/2024  9:19 AM)

## 2024-10-29 NOTE — H&P
H&P - Gastroenterology   Name: Mino Cyr 64 y.o. male I MRN: [id number]  Unit/Bed#:  I Date of Admission: 10/29/2024   Date of Service: 10/29/2024 I Hospital Day: 0     Assessment & Plan   This is a 64 y.o. year old male here for colonoscopy, and he is stable and optimized for his procedure.    History of Present Illness    Mino Cyr is a 64 y.o. year old male who presents for colon cancer screening    REVIEW OF SYSTEMS: Per the HPI, and otherwise unremarkable.    Historical Information   Past Medical History:   Diagnosis Date    Anxiety     Depression     Osteoporosis     Pre-diabetes      Past Surgical History:   Procedure Laterality Date    APPENDECTOMY       Social History     Tobacco Use    Smoking status: Every Day     Current packs/day: 1.00     Types: Cigarettes     Passive exposure: Current    Smokeless tobacco: Never   Vaping Use    Vaping status: Never Used   Substance and Sexual Activity    Alcohol use: Never    Drug use: Not Currently     Types: Cocaine, Fentanyl, Heroin    Sexual activity: Not on file     E-Cigarette/Vaping    E-Cigarette Use Never User      E-Cigarette/Vaping Substances     Family history non-contributory    Meds/Allergies     Current Outpatient Medications:     lisinopril (ZESTRIL) 10 mg tablet    sertraline (Zoloft) 50 mg tablet    Blood Pressure KIT    naloxone (NARCAN) 4 mg/0.1 mL nasal spray    polyethylene glycol (Golytely) 4000 mL solution    Current Facility-Administered Medications:     lactated ringers infusion, 50 mL/hr, Intravenous, Continuous  Allergies   Allergen Reactions    Penicillins Swelling       Objective :  Temp:  [98 °F (36.7 °C)] 98 °F (36.7 °C)  HR:  [61] 61  BP: (175)/(82) 175/82  Resp:  [15] 15  SpO2:  [95 %] 95 %  O2 Device: None (Room air)    Physical Exam  Gen: NAD  Head: NCAT  CV: RRR  CHEST: Clear  ABD: soft, NT/ND  EXT: no edema

## 2024-11-01 ENCOUNTER — TELEPHONE (OUTPATIENT)
Age: 64
End: 2024-11-01

## 2024-11-01 PROCEDURE — 88305 TISSUE EXAM BY PATHOLOGIST: CPT | Performed by: PATHOLOGY

## 2024-11-01 NOTE — TELEPHONE ENCOUNTER
----- Message from Diana Jaiyeola, MD sent at 11/1/2024  3:37 PM EDT -----  Please call the patient with the results    The  colon polyp removed was called an adenoma. This is a pre-cancerous lesion and was completely removed. There was no evidence of cancer in the polyp.     He should have the colonoscopy repeated in 5 years due to a history of colon polyps.

## 2024-11-01 NOTE — RESULT ENCOUNTER NOTE
Please call the patient with the results    The  colon polyp removed was called an adenoma. This is a pre-cancerous lesion and was completely removed. There was no evidence of cancer in the polyp.     He should have the colonoscopy repeated in 5 years due to a history of colon polyps.

## 2025-06-25 ENCOUNTER — HOSPITAL ENCOUNTER (EMERGENCY)
Facility: HOSPITAL | Age: 65
Discharge: HOME | End: 2025-06-25
Attending: EMERGENCY MEDICINE
Payer: MEDICARE

## 2025-06-25 VITALS
SYSTOLIC BLOOD PRESSURE: 134 MMHG | BODY MASS INDEX: 25.21 KG/M2 | DIASTOLIC BLOOD PRESSURE: 76 MMHG | HEART RATE: 72 BPM | OXYGEN SATURATION: 99 % | RESPIRATION RATE: 18 BRPM | TEMPERATURE: 98.3 F | WEIGHT: 160.94 LBS

## 2025-06-25 DIAGNOSIS — T50.901A OVERDOSE: ICD-10-CM

## 2025-06-25 DIAGNOSIS — F11.10 HEROIN ABUSE (HCC): Primary | ICD-10-CM

## 2025-06-25 DIAGNOSIS — R53.83 LETHARGY: ICD-10-CM

## 2025-06-25 LAB
ATRIAL RATE: 65 BPM
GLUCOSE SERPL-MCNC: 104 MG/DL (ref 65–140)
P AXIS: 71 DEGREES
PR INTERVAL: 140 MS
QRS AXIS: 81 DEGREES
QRSD INTERVAL: 72 MS
QT INTERVAL: 392 MS
QTC INTERVAL: 407 MS
T WAVE AXIS: 72 DEGREES
VENTRICULAR RATE: 65 BPM

## 2025-06-25 PROCEDURE — 93010 ELECTROCARDIOGRAM REPORT: CPT

## 2025-06-25 PROCEDURE — 93005 ELECTROCARDIOGRAM TRACING: CPT

## 2025-06-25 PROCEDURE — 99285 EMERGENCY DEPT VISIT HI MDM: CPT

## 2025-06-25 PROCEDURE — 82948 REAGENT STRIP/BLOOD GLUCOSE: CPT

## 2025-06-25 PROCEDURE — 99291 CRITICAL CARE FIRST HOUR: CPT | Performed by: EMERGENCY MEDICINE

## 2025-06-25 PROCEDURE — 96374 THER/PROPH/DIAG INJ IV PUSH: CPT

## 2025-06-25 RX ORDER — NALOXONE HYDROCHLORIDE 1 MG/ML
1 INJECTION INTRAMUSCULAR; INTRAVENOUS; SUBCUTANEOUS ONCE
Status: COMPLETED | OUTPATIENT
Start: 2025-06-25 | End: 2025-06-25

## 2025-06-25 RX ADMIN — NALOXONE HYDROCHLORIDE 1 MG: 1 INJECTION PARENTERAL at 11:25

## 2025-06-25 NOTE — ED PROVIDER NOTES
Time reflects when diagnosis was documented in both MDM as applicable and the Disposition within this note       Time User Action Codes Description Comment    6/25/2025 12:03 PM Liam Navarrete [F11.10] Heroin abuse (HCC)     6/25/2025 12:03 PM Liam Navarrete [R53.83] Lethargy     6/25/2025 12:04 PM Liam Navarrete Add [T50.901A] Overdose           ED Disposition       ED Disposition   AMA    Condition   --    Date/Time   Wed Jun 25, 2025 12:04 PM    Comment   Date: 6/25/2025  Patient: Mino Cyr  Admitted: 6/25/2025 10:48 AM  Attending Provider: Liam Navarrete MD    Mino Cyr or his authorized caregiver has made the decision for the patient to leave the emergency department against the advi ce of his attending physician. He or his authorized caregiver has been informed and understands the inherent risks, including death, chronic pain, permanent disability, prolonged hospital course/bills, traumatic injuries.  He or his authorized caregi brown has decided to accept the responsibility for this decision. Mino Cyr and all necessary parties have been advised that he may return for further evaluation or treatment. His condition at time of discharge was stable.  Mino Cyr had  current vital signs as follows:  /59 (BP Location: Right arm)   Pulse 64   Temp 98.3 °F (36.8 °C) (Oral)   Resp 14   Wt 73 kg (160 lb 15 oz)                Assessment & Plan       Medical Decision Making  Opiate overdose which was not a suicide attempt.  Will treat with Narcan, reassess for disposition.    Amount and/or Complexity of Data Reviewed  Labs: ordered.    Risk  Prescription drug management.        ED Course as of 06/25/25 1234   Wed Jun 25, 2025   1134 Critical Care Time Statement: Upon my evaluation, this patient had a high probability of imminent or life-threatening deterioration due to opiate overdose, which required my direct attention, intervention, and personal management.  I spent a total of  35 minutes directly providing critical care services, including interpretation of complex medical databases, evaluating for the presence of life-threatening injuries or illnesses, management of organ system failure(s) , complex medical decision making (to support/prevent further life-threatening deterioration)., and interpretation of hemodynamic data. This time is exclusive of procedures, teaching, treating other patients, family meetings, and any prior time recorded by providers other than myself.       1202 The patient insists on leaving against medical advice, despite my recommendation to remain for ongoing treatment.    1: Capacity: I have determined that the patient has capacity to make the decision to leave against medical advice based on the following:    A. Ability to express a choice: The patient is able to express his or her choice and communicate that choice.   B. Ability to understand relevant information: The patient is able to verbalize their diagnosis, understand information about the purpose of treatment, remember the information, and show that he or she can be part of the decision-making process.    C. Ability to appreciate the significance of the information and its consequences: The patient understands the consequences of treatment refusal and the risks and benefits of accepting or refusing treatment.    D. Ability to manipulate information: The patient is able to engage in reasoning as it applies to making treatment decisions   2: Psychiatric Consultation: There is not an indication to call psychiatry consultation to determine capacity    3. Alternative Treatment: I have discussed the recommended course of treatment and available alternatives.   4. Risks: I have discussed the specific risks of that patient refusing treatment death, chronic pain, permanent disability, loss of current lifestyle, prolonged hospital course/bills, traumatic injuries   5. Follow-up Care: I have discussed the follow-up  care and advised to see patient's PCP immediately or return here for worsening.   6. ED Option: I have emphasized that the patient has the option to return to the ED. Reviewed that we can have continuation of the workup at any time and that we are always open.          Medications   naloxone (NARCAN) injection 1 mg (1 mg Intravenous Given 6/25/25 1125)       ED Risk Strat Scores                    No data recorded                            History of Present Illness       Chief Complaint   Patient presents with    Drug Problem     Pt admits to snorting heroin last night and was picking up daughter at hospital and found to be going in and out of responsiveness with shallow respirations. Pt slow to respond A&O x3        Past Medical History[1]   Past Surgical History[2]   Family History[3]   Social History[4]   E-Cigarette/Vaping    E-Cigarette Use Never User       E-Cigarette/Vaping Substances      I have reviewed and agree with the history as documented.     65-year-old male presents evaluation of medical emergency.  Patient admits to snorting heroin earlier today.  He was dozing off and intermittently falling asleep.  Patient denies other coingestions, recent falls or head trauma, focal nervous weakness, headache.      History provided by:  Patient and relative  Drug Problem  Associated symptoms: fever    Associated symptoms: no abdominal pain, no chest pain, no congestion, no diarrhea, no ear pain, no fatigue, no myalgias, no nausea, no rash, no rhinorrhea, no shortness of breath, no sore throat, no vomiting and no wheezing        Review of Systems   Constitutional:  Positive for fever. Negative for activity change, appetite change and fatigue.   HENT:  Negative for congestion, dental problem, ear pain, rhinorrhea and sore throat.    Eyes:  Negative for pain and redness.   Respiratory:  Negative for chest tightness, shortness of breath and wheezing.    Cardiovascular:  Negative for chest pain and palpitations.    Gastrointestinal:  Negative for abdominal pain, blood in stool, constipation, diarrhea, nausea and vomiting.   Endocrine: Negative for cold intolerance and heat intolerance.   Genitourinary:  Negative for dysuria, frequency and hematuria.   Musculoskeletal:  Negative for arthralgias and myalgias.   Skin:  Negative for color change, pallor and rash.   Neurological:  Negative for weakness and numbness.   Hematological:  Does not bruise/bleed easily.   Psychiatric/Behavioral:  Negative for agitation, hallucinations and suicidal ideas.            Objective       ED Triage Vitals [06/25/25 1049]   Temperature Pulse Blood Pressure Respirations SpO2 Patient Position - Orthostatic VS   98.3 °F (36.8 °C) 70 150/73 16 94 % Sitting      Temp Source Heart Rate Source BP Location FiO2 (%) Pain Score    Oral Monitor Right arm -- No Pain      Vitals      Date and Time Temp Pulse SpO2 Resp BP Pain Score FACES Pain Rating User   06/25/25 1205 -- 72  99 % 18 134/76 No Pain --    06/25/25 1140 -- 64 100 % -- -- -- --    06/25/25 1106 -- -- 95 % -- -- -- --    06/25/25 1100 -- 58 88 % 14 104/59 -- --    06/25/25 1049 98.3 °F (36.8 °C) 70 94 % 16 150/73 No Pain --             Physical Exam  Constitutional:       Appearance: He is well-developed.   HENT:      Head: Normocephalic and atraumatic.     Eyes:      General: No scleral icterus.     Conjunctiva/sclera: Conjunctivae normal.     Neck:      Vascular: No JVD.      Trachea: No tracheal deviation.     Cardiovascular:      Rate and Rhythm: Normal rate and regular rhythm.   Pulmonary:      Effort: Pulmonary effort is normal. No respiratory distress.      Breath sounds: Normal breath sounds.   Abdominal:      General: There is no distension.     Musculoskeletal:         General: No deformity. Normal range of motion.      Cervical back: Normal range of motion.     Skin:     Coloration: Skin is not pale.      Findings: No erythema or rash.     Neurological:      General: No  focal deficit present.      Mental Status: He is alert and oriented to person, place, and time.      Cranial Nerves: No cranial nerve deficit.      Sensory: No sensory deficit.      Motor: No weakness.      Coordination: Coordination normal.      Gait: Gait normal.      Comments: Drowsy but arousable     Psychiatric:         Behavior: Behavior normal.         Results Reviewed       Procedure Component Value Units Date/Time    Fingerstick Glucose (POCT) [[id number]]  (Normal) Collected: 06/25/25 1131    Lab Status: Final result Specimen: Blood Updated: 06/25/25 1132     POC Glucose 104 mg/dl             No orders to display       Procedures    ED Medication and Procedure Management   Prior to Admission Medications   Prescriptions Last Dose Informant Patient Reported? Taking?   Blood Pressure KIT   No No   Sig: Use 2 (two) times a day   lisinopril (ZESTRIL) 10 mg tablet   No No   Sig: Take 1 tablet (10 mg total) by mouth daily   naloxone (NARCAN) 4 mg/0.1 mL nasal spray   No No   Sig: Administer 1 spray into a nostril. If no response after 2-3 minutes, give another dose in the other nostril using a new spray.   sertraline (Zoloft) 50 mg tablet   No No   Sig: Take 1 tablet (50 mg total) by mouth daily      Facility-Administered Medications: None     Discharge Medication List as of 6/25/2025 12:04 PM        CONTINUE these medications which have NOT CHANGED    Details   Blood Pressure KIT Use 2 (two) times a day, Starting Mon 8/14/2023, Normal      lisinopril (ZESTRIL) 10 mg tablet Take 1 tablet (10 mg total) by mouth daily, Starting Mon 9/23/2024, Normal      naloxone (NARCAN) 4 mg/0.1 mL nasal spray Administer 1 spray into a nostril. If no response after 2-3 minutes, give another dose in the other nostril using a new spray., Normal      sertraline (Zoloft) 50 mg tablet Take 1 tablet (50 mg total) by mouth daily, Starting Mon 9/23/2024, Normal           No discharge procedures on file.  ED SEPSIS DOCUMENTATION   Time  reflects when diagnosis was documented in both MDM as applicable and the Disposition within this note       Time User Action Codes Description Comment    6/25/2025 12:03 PM Liam Navarrete Add [F11.10] Heroin abuse (HCC)     6/25/2025 12:03 PM Liam Navarrete Add [R53.83] Lethargy     6/25/2025 12:04 PM Liam Navarrete Add [T50.901A] Overdose                    [1]   Past Medical History:  Diagnosis Date    Anxiety     Depression     Osteoporosis     Pre-diabetes    [2]   Past Surgical History:  Procedure Laterality Date    APPENDECTOMY     [3] No family history on file.  [4]   Social History  Tobacco Use    Smoking status: Every Day     Current packs/day: 1.00     Types: Cigarettes     Passive exposure: Current    Smokeless tobacco: Never   Vaping Use    Vaping status: Never Used   Substance Use Topics    Alcohol use: Never    Drug use: Not Currently     Types: Cocaine, Fentanyl, Heroin        Liam Navarrete MD  06/25/25 9735